# Patient Record
Sex: MALE | Race: WHITE
[De-identification: names, ages, dates, MRNs, and addresses within clinical notes are randomized per-mention and may not be internally consistent; named-entity substitution may affect disease eponyms.]

---

## 2019-01-16 ENCOUNTER — HOSPITAL ENCOUNTER (OUTPATIENT)
Dept: HOSPITAL 96 - M.RAD | Age: 59
End: 2019-01-16
Attending: NURSE PRACTITIONER
Payer: COMMERCIAL

## 2019-01-16 DIAGNOSIS — R50.9: ICD-10-CM

## 2019-01-16 DIAGNOSIS — I51.7: Primary | ICD-10-CM

## 2020-06-16 ENCOUNTER — HOSPITAL ENCOUNTER (INPATIENT)
Dept: HOSPITAL 96 - M.ERS | Age: 60
LOS: 3 days | Discharge: HOME | DRG: 309 | End: 2020-06-19
Attending: FAMILY MEDICINE | Admitting: FAMILY MEDICINE
Payer: COMMERCIAL

## 2020-06-16 VITALS — HEIGHT: 72.01 IN | WEIGHT: 254 LBS | BODY MASS INDEX: 34.4 KG/M2

## 2020-06-16 VITALS — DIASTOLIC BLOOD PRESSURE: 74 MMHG | SYSTOLIC BLOOD PRESSURE: 127 MMHG

## 2020-06-16 DIAGNOSIS — I48.0: Primary | ICD-10-CM

## 2020-06-16 DIAGNOSIS — Z79.899: ICD-10-CM

## 2020-06-16 DIAGNOSIS — Z79.82: ICD-10-CM

## 2020-06-16 DIAGNOSIS — Z88.8: ICD-10-CM

## 2020-06-16 DIAGNOSIS — Z82.49: ICD-10-CM

## 2020-06-16 DIAGNOSIS — D68.69: ICD-10-CM

## 2020-06-16 DIAGNOSIS — I42.9: ICD-10-CM

## 2020-06-16 DIAGNOSIS — Z88.0: ICD-10-CM

## 2020-06-16 DIAGNOSIS — E78.5: ICD-10-CM

## 2020-06-16 DIAGNOSIS — E11.9: ICD-10-CM

## 2020-06-16 DIAGNOSIS — F10.11: ICD-10-CM

## 2020-06-16 DIAGNOSIS — Z90.89: ICD-10-CM

## 2020-06-16 DIAGNOSIS — I10: ICD-10-CM

## 2020-06-16 LAB
ABSOLUTE BASOPHILS: 0.1 THOU/UL (ref 0–0.2)
ABSOLUTE EOSINOPHILS: 0.3 THOU/UL (ref 0–0.7)
ABSOLUTE MONOCYTES: 1.1 THOU/UL (ref 0–1.2)
ALBUMIN SERPL-MCNC: 4.3 G/DL (ref 3.4–5)
ALP SERPL-CCNC: 73 U/L (ref 46–116)
ALT SERPL-CCNC: 41 U/L (ref 30–65)
ANION GAP SERPL CALC-SCNC: 5 MMOL/L (ref 7–16)
AST SERPL-CCNC: 26 U/L (ref 15–37)
BASOPHILS NFR BLD AUTO: 0.8 %
BILIRUB SERPL-MCNC: 0.5 MG/DL
BUN SERPL-MCNC: 20 MG/DL (ref 7–18)
CALCIUM SERPL-MCNC: 9.2 MG/DL (ref 8.5–10.1)
CHLORIDE SERPL-SCNC: 104 MMOL/L (ref 98–107)
CO2 SERPL-SCNC: 34 MMOL/L (ref 21–32)
CREAT SERPL-MCNC: 1.3 MG/DL (ref 0.6–1.3)
EOSINOPHIL NFR BLD: 2.8 %
GLUCOSE SERPL-MCNC: 187 MG/DL (ref 70–99)
GRANULOCYTES NFR BLD MANUAL: 70.1 %
HCT VFR BLD CALC: 44.9 % (ref 42–52)
HGB BLD-MCNC: 15.9 GM/DL (ref 14–18)
INR PPP: 1.1
LIPASE: 159 U/L (ref 73–393)
LYMPHOCYTES # BLD: 1.7 THOU/UL (ref 0.8–5.3)
LYMPHOCYTES NFR BLD AUTO: 16 %
MAGNESIUM SERPL-MCNC: 2.1 MG/DL (ref 1.8–2.4)
MCH RBC QN AUTO: 32.1 PG (ref 26–34)
MCHC RBC AUTO-ENTMCNC: 35.4 G/DL (ref 28–37)
MCV RBC: 90.6 FL (ref 80–100)
MONOCYTES NFR BLD: 10.3 %
MPV: 7.2 FL. (ref 7.2–11.1)
NEUTROPHILS # BLD: 7.7 THOU/UL (ref 1.6–8.1)
NT-PRO BRAIN NAT PEPTIDE: 126 PG/ML (ref ?–300)
NUCLEATED RBCS: 0 /100WBC
PLATELET COUNT*: 274 THOU/UL (ref 150–400)
POTASSIUM SERPL-SCNC: 3.4 MMOL/L (ref 3.5–5.1)
PROT SERPL-MCNC: 8 G/DL (ref 6.4–8.2)
PROTHROMBIN TIME: 10.9 SECONDS (ref 9.2–11.5)
RBC # BLD AUTO: 4.96 MIL/UL (ref 4.5–6)
RDW-CV: 13.7 % (ref 10.5–14.5)
SODIUM SERPL-SCNC: 143 MMOL/L (ref 136–145)
WBC # BLD AUTO: 10.9 THOU/UL (ref 4–11)

## 2020-06-17 VITALS — SYSTOLIC BLOOD PRESSURE: 110 MMHG | DIASTOLIC BLOOD PRESSURE: 65 MMHG

## 2020-06-17 VITALS — SYSTOLIC BLOOD PRESSURE: 143 MMHG | DIASTOLIC BLOOD PRESSURE: 75 MMHG

## 2020-06-17 VITALS — DIASTOLIC BLOOD PRESSURE: 67 MMHG | SYSTOLIC BLOOD PRESSURE: 131 MMHG

## 2020-06-17 VITALS — DIASTOLIC BLOOD PRESSURE: 67 MMHG | SYSTOLIC BLOOD PRESSURE: 106 MMHG

## 2020-06-17 VITALS — DIASTOLIC BLOOD PRESSURE: 74 MMHG | SYSTOLIC BLOOD PRESSURE: 115 MMHG

## 2020-06-17 LAB
BILIRUB UR-MCNC: NEGATIVE MG/DL
COLOR UR: YELLOW
KETONES UR STRIP-MCNC: NEGATIVE MG/DL
PROT UR QL STRIP: NEGATIVE
RBC # UR STRIP: NEGATIVE /UL
SP GR UR STRIP: 1.02 (ref 1–1.03)
URINE CLARITY: CLEAR
URINE GLUCOSE-RANDOM: NEGATIVE
URINE LEUKOCYTES-REFLEX: NEGATIVE
URINE NITRITE-REFLEX: NEGATIVE
UROBILINOGEN UR STRIP-ACNC: 0.2 E.U./DL (ref 0.2–1)

## 2020-06-17 NOTE — 2DMMODE
[place] [address]
Phone:  [phone] 2 D/M-MODE ECHOCARDIOGRAM     
_______________________________________________________________________________
 
Name:         YOLANDA POST             Room:          [address] Jennifer ANDREWS#:    [id number]     Account #:     [id number]  
Admission:    20    Attend Phys:   Abran galvez Sa
Discharge:                Date of Birth: [birth year]  
Date of Service: 20 1301  Report #:      [id number]
        [id number]
_______________________________________________________________________________
THIS REPORT FOR:
 
cc:  Nik Bauer,Nik Smith,Yolanda ARAYA MD [facility]        
                                                                       ~
 
--------------- APPROVED REPORT --------------
 
 
Study performed:  2020 11:35:31
 
EXAM: Comprehensive 2D, Doppler, and color-flow 
Echocardiogram 
Patient Location: In-Patient   
 
      BSA:         2.37
HR: 56 bpm BP:          143/75 mmHg 
 
Other Information 
Study Quality: Fair
 
Indications
Atrial Fibrillation
 
2D Dimensions
IVSd:  12.05 (7-11mm) LVOT Diam:  21.46 (18-24mm) 
LVDd:  45.00 mm  
PWd:  11.67 (7-11mm) Ascending Ao:  36.28 (22-36mm)
LVDs:  34.41 (25-40mm) 
Aortic Root:  32.23 mm 
 
Volumes
Left Atrial Volume (Systole) 
    LA ESV Index:  28.00 mL/m2
 
Aortic Valve
AoV Peak Tapan.:  1.12 m/s 
AO Peak Gr.:  5.06 mmHg  LVOT Max PG:  3.32 mmHg
AO Mean Gr.:  3.03 mmHg  LVOT Mean P.66 mmHg
    LVOT Max V:  0.91 m/s
AO V2 VTI:  25.42 cm  LVOT Mean V:  0.60 m/s
HERIBERTO (VTI):  2.57 cm2  LVOT V1 VTI:  18.04 cm
 
Mitral Valve
    E/A Ratio:  1.18
 
 
[place] [address]
Phone:  [phone]                     2 D/M-MODE ECHOCARDIOGRAM     
_______________________________________________________________________________
 
Name:         YOLANDA POST             Room:          [address]
M.R.#:    [id number]     Account #:     [id number]  
Admission:    20    Attend Phys:   Abran galvez Sa
Discharge:                Date of Birth: [birth year]  
Date of Service: 20 1301  Report #:      [id number]
        [id number]
_______________________________________________________________________________
    MV Decel. Time:  205.12 ms
MV E Max Tapan.:  0.54 m/s 
MV PHT:  59.49 ms  
MVA (PHT):  3.70 cm2  
 
TDI
E/Lateral E':  5.40 E/Medial E':  6.00
   Medial E' Tapan.:  0.09 m/s
   Lateral E' Tapan.:  0.10 m/s
 
Pulmonary Valve
PV Peak Tapan.:  0.84 m/s PV Peak Gr.:  2.81 mmHg
 
Tricuspid Valve
    RAP Estimate:  20.00 mmHg
TR Peak Gr.:  26.60 mmHg RVSP:  46.60 mmHg
    PA Pressure:  46.60 mmHg
 
Left Ventricle
The left ventricle is normal size. There is normal LV segmental wall 
motion. Mild concentric left ventricular hypertrophy. Left 
ventricular systolic function is borderline. LVEF is 45-50%.
 
Right Ventricle
Right ventricle is dilated. The right ventricular systolic function 
is normal.
 
Atria
The left atrium size is normal. Atrial septal aneurysm is present. 
Right atrium is dilated.
 
Aortic Valve
The aortic valve is normal in structure. No aortic regurgitation is 
present. There is no aortic valvular stenosis.
 
Mitral Valve
The mitral valve is normal in structure. There is no mitral valve 
regurgitation noted. No evidence of mitral valve stenosis.
 
Tricuspid Valve
The tricuspid valve is normal in structure. Trace tricuspid 
regurgitation.
 
Pulmonic Valve
Pulmonic valve is not well visualized. There is no pulmonic valvular 
regurgitation.
 
 
[place] [address]
Phone:  [phone]                     2 D/M-MODE ECHOCARDIOGRAM     
_______________________________________________________________________________
 
Name:         YOLANDA POST             Room:          [address]#:    [id number]     Account #:     [id number]  
Admission:    20    Attend Phys:   Abran galvez Sa
Discharge:                Date of Birth: [birth year]  
Date of Service: 20 1301  Report #:      [id number]
        [id number]
_______________________________________________________________________________
 
Great Vessels
The aortic root is normal in size. IVC is dilated.
 
Pericardium
There is no pericardial effusion.
 
<Conclusion>
LVEF is 45-50%.
Mild concentric left ventricular hypertrophy.
 
 
 
 
 
 
 
 
 
 
 
 
 
 
 
 
 
 
 
 
 
 
 
 
 
 
 
 
 
 
 
 
 
 
  <ELECTRONICALLY SIGNED>
                                           By: Yolanda Logan MD, [facility]      
  20     1301
D: 20 130   _____________________________________
T: 20 130   Yolanda Logan MD, FAC        /INF

## 2020-06-17 NOTE — NUR
PT PLACED ON HEPARIN QTT FOR ANTICOAGULATION. PT CONVERTED TO SB. CARDIZEM QTT
TURNED DOWN TO 2.5 MG/HR. REMAINS NPO FOR CARDIOLOGY CONSULT.

## 2020-06-17 NOTE — NUR
PT ALERT ORIENTED. VOIDING PER URINAL AT BS.  OUT OF ROLLING IV POLES. PTS IV
PUMP IS ON TABLE. CARDIZEM QTT AT 10MG/HR. IV DC PER PT AND RESTARTED IN L
HAND. O2 AT 2 LITERS NC. NPO FOR CARDIOLOGY CONSULT. WCTM

## 2020-06-17 NOTE — EKG
[place] [address]
Phone:  [phone]                     ELECTROCARDIOGRAM REPORT      
_______________________________________________________________________________
 
Name:         YOLANDA POST             Room:          [address]
M.R.#:    [id number]     Account #:     [id number]  
Admission:    20    Attend Phys:   Abran galvez Sa
Discharge:                Date of Birth: [birth year]  
Date of Service: 20  Report #:      [id number]
        [id number]
_______________________________________________________________________________
THIS REPORT FOR:  //name//                      
 
                         [facility] ED
                                       
Test Date:    2020               Test Time:    20:48:50
Pat Name:     YOLANDA POST               Department:   
Patient ID:   [id number]            Room:         [facility]
Gender:       M                        Technician:   MATT
:          1960               Requested By: Dia Lorenzana
Order Number: [id number] MD:   Yolanda Logan
                                 Measurements
Intervals                              Axis          
Rate:         118                      P:            
IN:                                    QRS:          8
QRSD:         98                       T:            12
QT:           297                                    
QTc:          417                                    
                           Interpretive Statements
Atrial fibrillation
Borderline repolarization abnormality
No previous ECG available for comparison
 
Electronically Signed On 2020 9:10:02 CDT by Yolanda Logan
[email or web address]
 
 
 
 
 
 
 
 
 
 
 
 
 
 
 
 
 
 
 
 
  <ELECTRONICALLY SIGNED>
                                           By: Yolanda Logan MD, [facility]      
  06/910
D: 20   _____________________________________
T: 20   Yolanda Logan MD, [facility]        /EPI

## 2020-06-17 NOTE — NUR
CARDIOLOGY SAW PT.TODAY IN CONSULT. PUT ON SOTALOL AND ELIQUIS BY CARDIOLOGY.
NO USE OF DME AT HOME AND IS USUALLY INDEPENDENT. CM WILL FOLLOW.

## 2020-06-17 NOTE — NUR
PT IN BED T/O DAY DENIES ANY PAIN IV HEPARIN AND CARDIZEM GTT DC'D AROUND 10AM
PO SOTALOL TO CONTINUE SINUS RACHANA ON THE MONITOR 55-60BPM 2L NC AT 97-98%
DOES NOT WEAR O2 AT HOME PLAN FOR CARDIO TO MONITOR PT THE NEXT COUPLE OF DAYS
TO STAY IN RHYTHM ACHS STARTED D/T PREDIABETIC BEFORE AND BLOOD SUGAR WAS
ELEVATED DURING ADMIT CALL LIGHT IN REACH

## 2020-06-17 NOTE — NUR
Cardiac Rehab education on Atrila Fib completed with receptive patient and
wife using the Atrial Fib handout.  Questions answered to patient and wife's
satisfaction.

## 2020-06-18 VITALS — SYSTOLIC BLOOD PRESSURE: 130 MMHG | DIASTOLIC BLOOD PRESSURE: 79 MMHG

## 2020-06-18 VITALS — SYSTOLIC BLOOD PRESSURE: 121 MMHG | DIASTOLIC BLOOD PRESSURE: 80 MMHG

## 2020-06-18 VITALS — SYSTOLIC BLOOD PRESSURE: 128 MMHG | DIASTOLIC BLOOD PRESSURE: 73 MMHG

## 2020-06-18 VITALS — SYSTOLIC BLOOD PRESSURE: 116 MMHG | DIASTOLIC BLOOD PRESSURE: 64 MMHG

## 2020-06-18 VITALS — SYSTOLIC BLOOD PRESSURE: 130 MMHG | DIASTOLIC BLOOD PRESSURE: 77 MMHG

## 2020-06-18 LAB
ANION GAP SERPL CALC-SCNC: 5 MMOL/L (ref 7–16)
BUN SERPL-MCNC: 17 MG/DL (ref 7–18)
CALCIUM SERPL-MCNC: 8.9 MG/DL (ref 8.5–10.1)
CHLORIDE SERPL-SCNC: 105 MMOL/L (ref 98–107)
CO2 SERPL-SCNC: 31 MMOL/L (ref 21–32)
CREAT SERPL-MCNC: 1.1 MG/DL (ref 0.6–1.3)
EST. AVERAGE GLUCOSE BLD GHB EST-MCNC: 131 MG/DL
GLUCOSE SERPL-MCNC: 114 MG/DL (ref 70–99)
GLYCOHEMOGLOBIN (HGB A1C): 6.2 % (ref 4.8–5.6)
HCT VFR BLD CALC: 42.8 % (ref 42–52)
HGB BLD-MCNC: 15.1 GM/DL (ref 14–18)
MAGNESIUM SERPL-MCNC: 2.1 MG/DL (ref 1.8–2.4)
MCH RBC QN AUTO: 32 PG (ref 26–34)
MCHC RBC AUTO-ENTMCNC: 35.2 G/DL (ref 28–37)
MCV RBC: 91.1 FL (ref 80–100)
MPV: 7.1 FL. (ref 7.2–11.1)
PLATELET COUNT*: 253 THOU/UL (ref 150–400)
POTASSIUM SERPL-SCNC: 3.8 MMOL/L (ref 3.5–5.1)
RBC # BLD AUTO: 4.7 MIL/UL (ref 4.5–6)
RDW-CV: 13.4 % (ref 10.5–14.5)
SODIUM SERPL-SCNC: 141 MMOL/L (ref 136–145)
WBC # BLD AUTO: 9 THOU/UL (ref 4–11)

## 2020-06-18 NOTE — NUR
PT IN BED T/O MORNING HAD BM BLOOD SUGAR WNL HGB A1C WAS 6.2 THOUGH PT DENIES
PAIN STILL SINUS RACHANA ON THE MONITOR 55-60S SOTALOL CONTINUES PT IS
DROWSY/SLEEPY BUT NO OTHER SIDE EFFECTS NOTED ALERT AND ORIENTED UP AD [facility]
CALL LIGHT IN REACH

## 2020-06-18 NOTE — CON
[address]  [address]                    CONSULTATION                  
_______________________________________________________________________________
 
Name:       SINCEREYOLANDA YOLANDA              Room:           [address]    [place]M [place]  
M.R.#:  [id number]      Account #:      [id number]  
Admission:  06/17/20     Attend Phys:    Abran Camacho
Discharge:               Date of Birth:  [birth year]  
         Report #: [id number]
                                                                     [id number]  
_______________________________________________________________________________
THIS REPORT FOR:  //name//                      
 
cc:  Nik Bauer Vincent R. DO                                                
THIS REPORT FOR:  //name//                      
 
CC: Abran Petty
 
DATE OF SERVICE:  06/17/2020
 
 
CARDIOLOGY CONSULTATION
 
HISTORY OF PRESENT ILLNESS:  The patient is a 60-year-old  white male who
I was asked to see in the hospital today after he had an episode of atrial
fibrillation.  The patient has an extensive and complicated past medical
history.  Unfortunately, none of his old records are available here at [facility].  He notes that 10 years ago, he was having episodes of shortness of
breath and diaphoresis.  He was found to have evidence of a cardiomyopathy.  He
apparently underwent a cardiac catheterization at [facility] and
told there was no significant coronary artery disease.  At one time, his
ejection fraction was only 32%.  He denied ever being approached for transplant
or need for ICD.  He has been followed by Cardiology since that time.  He does
note a year ago, he saw his cardiologist at [facility] and his ejection fraction
was up to 69%.  He stays very active.  Denies any recent shortness of breath,
edema, orthopnea, fever, cough, chest tightness.  He does note few months ago,
he had an episode where his heart was beating irregular lasted about a few
minutes, resolved.  He then notes that last night he was at home, he felt his
heart beating irregular, felt a little lightheaded.  He was brought to the
Emergency Room at [facility] by private vehicle.  He was found to be in atrial
fibrillation.  He was started on IV diltiazem.  He converted to sinus rhythm.  I
was asked to see him for further evaluation and treatment.  He denies recent
bleeding episodes.
 
PAST MEDICAL HISTORY:  He has had appendectomy, knee surgery.  He has a history
of hypertension, hyperlipidemia, glucose intolerance.
 
MEDICATIONS:  Include carvedilol, lisinopril, Lipitor, aspirin and uses inhaler
for asthma.
 
ALLERGIES:  HE HAS AN ALLERGY TO PENICILLIN.
 
FAMILY HISTORY:  His father had a heart attack.
 
SOCIAL HISTORY:  He is .  He and his wife live in [place], [place].  He
 
 
 
[place] [address]                    CONSULTATION                  
_______________________________________________________________________________
 
Name:       YOLANDA POST              Room:           [address] [place]  
[facility]#:  [id number]      Account #:      [id number]  
Admission:  06/17/20     Attend Phys:    Abran Camacho
Discharge:               Date of Birth:  [birth year]  
         Report #: [id number]
                                                                     [id number]  
_______________________________________________________________________________
works as a .  He does not exercise on a regular basis.  No tobacco
abuse.  He used to drink up to a fifth of whiskey a day.  He did not go through
AA, but quit drinking years ago when he was diagnosed cardiomyopathy, now has 1
drink of alcohol a day.  Drinks coffee every day.  He has a history of smoking
methamphetamines in the past, no longer abuses drugs, denies IV drug abuse.
 
REVIEW OF SYSTEMS:  No history of stroke.  He does snore at night.  No history
of liver disease, kidney disease, cancer, psychiatric illness, chronic skin
condition.
 
PHYSICAL EXAMINATION:
GENERAL:  Revealed a large male who is 5 feet 10 inches, 260 pounds, he used to
weigh 275 pounds.
HEENT:  He is anicteric.  Conjunctivae are pink.  Mucous membranes moist.
NECK:  Veins nondistended.  No carotid bruits.  Neck supple.
CHEST:  Clear to auscultation.
CARDIOVASCULAR:  Regular rate and rhythm.
ABDOMEN:  Obese.
EXTREMITIES:  Had no edema.
SKIN:  Warm and dry.
NEUROLOGIC:  Nonfocal.
 
DIAGNOSTIC DATA:  His ECG last night when he arrived in the Emergency Room at
8:00, he was in atrial fibrillation with an increased ventricular response rate.
 Currently, he is in a sinus rhythm.  His workup in the Emergency Room last
night, he had a portable chest x-ray that showed normal heart size, clear lung
fields.
 
LABORATORY WORK:  Sodium 143, potassium 3.4, creatinine 1.3, glucose is 187. 
His liver function studies were normal.  Troponin 0.06.  .  TSH 5.7.  His
white blood cell count 10.9, hemoglobin 15.9.
 
IMPRESSION AND RECOMMENDATIONS:
1.  Paroxysmal atrial fibrillation.  I would recommend switching from carvedilol
to sotalol.  I would continue anticoagulation because of his history of
cardiomyopathy.  At this time, I would recommend starting Eliquis 5 mg every 12
hours.
2.  Previous history of cardiomyopathy.  Appears to resolve.  Recommend repeat
echo.  The patient is on a beta blocker and ACE inhibitor.
3.  Hyperlipidemia.  The patient is on a statin drug.
4.  Glucose intolerance.
5.  History of alcohol abuse.
6.  History of illicit drug use.
 
 
 
[address]  [address]                    CONSULTATION                  
_______________________________________________________________________________
 
Name:       YOLANDA POST              Room:           [address] [place]  
M.R.#:  [id number]      Account #:      [id number]  
Admission:  06/17/20     Attend Phys:    Abran crouch [place]
Discharge:               Date of Birth:  [birth year]  
         Report #: [id number]
                                                                     [id number]  
_______________________________________________________________________________
7.  Snoring at night.  I would rule out sleep apnea.
8.  Obesity.
 
 
 
 
 
 
 
 
 
 
 
 
 
 
 
 
 
 
 
 
 
 
 
 
 
 
 
 
 
 
 
 
 
 
 
 
 
 
 
 
 
 
<ELECTRONICALLY SIGNED>
                                        By:  Yolanda Logan MD, FACC      
06/18/20     1658
D: 06/17/20 1105_______________________________________
T: 06/17/20 1436Damaco Logan MD, FACC         /nt

## 2020-06-18 NOTE — EKG
[place] [address]
Phone:  [phone]                     ELECTROCARDIOGRAM REPORT      
_______________________________________________________________________________
 
Name:         YOLANDA POST             Room:          [address]
M.R.#:    [id number]     Account #:     [id number]  
Admission:    20    Attend Phys:   Abran galvez Sa
Discharge:                Date of Birth: [birth year]  
Date of Service: 20  Report #:      [id number]
        [id number]
_______________________________________________________________________________
THIS REPORT FOR:  //name//                      
 
                          [facility]
                                       
Test Date:    2020               Test Time:    08:22:59
Pat Name:     YOLANDA POST               Department:   
Patient ID:   [id number]            Room:         [address]
Gender:       M                        Technician:   
:          1960               Requested By: Yolanda Logan
Order Number: [id number]    [place] MD:   Yolanda Logan
                                 Measurements
Intervals                              Axis          
Rate:         50                       P:            20
IL:           170                      QRS:          8
QRSD:         94                       T:            33
QT:           436                                    
QTc:          398                                    
                           Interpretive Statements
Sinus bradycardia
Compared to ECG 2020 20:48:50
Atrial fibrillation no longer present
 
Electronically Signed On 2020 10:25:15 CDT by Yolanda Logan
[email or web address]
 
 
 
 
 
 
 
 
 
 
 
 
 
 
 
 
 
 
 
 
  <ELECTRONICALLY SIGNED>
                                           By: Yolanda Logan MD, [facility]      
  20     1025
D: 20   _____________________________________
T: 20   Yolanda Logan MD, [facility]        /EPI

## 2020-06-19 VITALS — SYSTOLIC BLOOD PRESSURE: 120 MMHG | DIASTOLIC BLOOD PRESSURE: 75 MMHG

## 2020-06-19 VITALS — DIASTOLIC BLOOD PRESSURE: 81 MMHG | SYSTOLIC BLOOD PRESSURE: 119 MMHG

## 2020-06-19 VITALS — SYSTOLIC BLOOD PRESSURE: 141 MMHG | DIASTOLIC BLOOD PRESSURE: 83 MMHG

## 2020-06-19 VITALS — DIASTOLIC BLOOD PRESSURE: 83 MMHG | SYSTOLIC BLOOD PRESSURE: 141 MMHG

## 2020-06-19 NOTE — EKG
[place] [address]
Phone:  [phone]                     ELECTROCARDIOGRAM REPORT      
_______________________________________________________________________________
 
Name:         YOLANDA POST             Room:          [address]    ADM IN 
M.R.#:    [id number]     Account #:     [id number]  
Admission:    20    Attend Phys:   Abran galvez Sa
Discharge:                Date of Birth: [birth year]  
Date of Service: 20 0842  Report #:      [id number]
        [id number]
_______________________________________________________________________________
THIS REPORT FOR:  //name//                      
 
                          [facility]
                                       
Test Date:    2020               Test Time:    08:42:31
Pat Name:     YOLANDA POST               Department:   
Patient ID:   [id number]            Room:         [address]
Gender:       M                        Technician:   
:          1960               Requested By: Yolanda Logan
Order Number: [id number]    [place] MD:   Yolanda Logan
                                 Measurements
Intervals                              Axis          
Rate:         53                       P:            38
FL:           162                      QRS:          6
QRSD:         99                       T:            36
QT:           428                                    
QTc:          402                                    
                           Interpretive Statements
Sinus bradycardia
Compared to ECG 2020 08:22:59
no change
 
Electronically Signed On 2020 10:25:46 CDT by Yolanda Logan
[email or web address]
 
 
 
 
 
 
 
 
 
 
 
 
 
 
 
 
 
 
 
 
  <ELECTRONICALLY SIGNED>
                                           By: Yolanda Logan MD, [facility]      
  20     1025
D: 20 0842   _____________________________________
T: 20 0842   Yolanda Logan MD, [facility]        /EPI

## 2020-06-19 NOTE — NUR
PT CARE ASSUMED AT 1930. SAT MAINTAINED IN RA. ALERT AND ORIENTED X4. DENIES
PAIN AND SOB. CALL LIGHT WITHIN REACH AND BED IN LOW POSITION. HOURLY ROUNDING
DONE FOR PT SAFETY.

## 2020-06-19 NOTE — NUR
ASSUMED CARE OF PATIENT THIS AM AT 0730.  PATIENT IS ALERT AND ORIENTED X 4.
HE DENIES PAIN AND DISCOMFORT.  PATIENT CONTINUES SR TO SBRADY THIS AM.
DR ROBERTO IN THIS AM TO WRITE DISCHARGE ORDERS.  FOLLOWED BY DR OROSCO.
MEDICATION SAMPLES GIVEN TO PATIENT BY CARDIOLOGY NURSE.  PATIENT GIVEN
DISCHARGE,FOLLOWUP INSTRUCTIONS, PRESCRIPTIONS AND CARENOTES.  SALINE LOCK AND
TELE MONITOR DISCONTINUED.  PATIENT DISCHARGED TO HOME WITH SPOUSE.

## 2021-05-24 ENCOUNTER — HOSPITAL ENCOUNTER (OUTPATIENT)
Dept: HOSPITAL 96 - M.ERS | Age: 61
Setting detail: OBSERVATION
LOS: 2 days | Discharge: HOME | End: 2021-05-26
Attending: STUDENT IN AN ORGANIZED HEALTH CARE EDUCATION/TRAINING PROGRAM | Admitting: STUDENT IN AN ORGANIZED HEALTH CARE EDUCATION/TRAINING PROGRAM
Payer: COMMERCIAL

## 2021-05-24 VITALS — BODY MASS INDEX: 32.2 KG/M2 | HEIGHT: 72.99 IN | WEIGHT: 243 LBS

## 2021-05-24 VITALS — DIASTOLIC BLOOD PRESSURE: 60 MMHG | SYSTOLIC BLOOD PRESSURE: 105 MMHG

## 2021-05-24 VITALS — DIASTOLIC BLOOD PRESSURE: 66 MMHG | SYSTOLIC BLOOD PRESSURE: 132 MMHG

## 2021-05-24 VITALS — SYSTOLIC BLOOD PRESSURE: 176 MMHG | DIASTOLIC BLOOD PRESSURE: 133 MMHG

## 2021-05-24 VITALS — DIASTOLIC BLOOD PRESSURE: 71 MMHG | SYSTOLIC BLOOD PRESSURE: 127 MMHG

## 2021-05-24 DIAGNOSIS — E11.9: ICD-10-CM

## 2021-05-24 DIAGNOSIS — I42.9: ICD-10-CM

## 2021-05-24 DIAGNOSIS — E78.5: ICD-10-CM

## 2021-05-24 DIAGNOSIS — Z79.899: ICD-10-CM

## 2021-05-24 DIAGNOSIS — Z20.822: ICD-10-CM

## 2021-05-24 DIAGNOSIS — I10: ICD-10-CM

## 2021-05-24 DIAGNOSIS — I48.20: Primary | ICD-10-CM

## 2021-05-24 DIAGNOSIS — Z88.0: ICD-10-CM

## 2021-05-24 DIAGNOSIS — Z79.82: ICD-10-CM

## 2021-05-24 LAB
ABSOLUTE BASOPHILS: 0.1 THOU/UL (ref 0–0.2)
ABSOLUTE EOSINOPHILS: 0.3 THOU/UL (ref 0–0.7)
ABSOLUTE MONOCYTES: 1.1 THOU/UL (ref 0–1.2)
ALBUMIN SERPL-MCNC: 4.3 G/DL (ref 3.4–5)
ALP SERPL-CCNC: 88 U/L (ref 46–116)
ALT SERPL-CCNC: 39 U/L (ref 30–65)
ANION GAP SERPL CALC-SCNC: 7 MMOL/L (ref 7–16)
AST SERPL-CCNC: 19 U/L (ref 15–37)
BASOPHILS NFR BLD AUTO: 0.8 %
BILIRUB SERPL-MCNC: 0.5 MG/DL
BUN SERPL-MCNC: 16 MG/DL (ref 7–18)
CALCIUM SERPL-MCNC: 8.9 MG/DL (ref 8.5–10.1)
CHLORIDE SERPL-SCNC: 107 MMOL/L (ref 98–107)
CO2 SERPL-SCNC: 29 MMOL/L (ref 21–32)
CREAT SERPL-MCNC: 1 MG/DL (ref 0.6–1.3)
EOSINOPHIL NFR BLD: 2.5 %
GLUCOSE SERPL-MCNC: 120 MG/DL (ref 70–99)
GRANULOCYTES NFR BLD MANUAL: 71.5 %
HCT VFR BLD CALC: 44.1 % (ref 42–52)
HGB BLD-MCNC: 15.3 GM/DL (ref 14–18)
LIPASE: 211 U/L (ref 73–393)
LYMPHOCYTES # BLD: 1.7 THOU/UL (ref 0.8–5.3)
LYMPHOCYTES NFR BLD AUTO: 15.4 %
MAGNESIUM SERPL-MCNC: 2.1 MG/DL (ref 1.8–2.4)
MCH RBC QN AUTO: 31.2 PG (ref 26–34)
MCHC RBC AUTO-ENTMCNC: 34.7 G/DL (ref 28–37)
MCV RBC: 90.2 FL (ref 80–100)
MONOCYTES NFR BLD: 9.8 %
MPV: 6.7 FL. (ref 7.2–11.1)
NEUTROPHILS # BLD: 8.1 THOU/UL (ref 1.6–8.1)
NT-PRO BRAIN NAT PEPTIDE: 181 PG/ML (ref ?–300)
NUCLEATED RBCS: 0 /100WBC
PLATELET COUNT*: 287 THOU/UL (ref 150–400)
POTASSIUM SERPL-SCNC: 3.6 MMOL/L (ref 3.5–5.1)
PROT SERPL-MCNC: 8.1 G/DL (ref 6.4–8.2)
RBC # BLD AUTO: 4.89 MIL/UL (ref 4.5–6)
RDW-CV: 13.4 % (ref 10.5–14.5)
SODIUM SERPL-SCNC: 143 MMOL/L (ref 136–145)
WBC # BLD AUTO: 11.3 THOU/UL (ref 4–11)

## 2021-05-24 NOTE — EKG
[place] [address]
Phone:  [phone]                     ELECTROCARDIOGRAM REPORT      
_______________________________________________________________________________
 
Name:         SINCEREYOLANDA             Room:                     [facility] 
M.R.#:    [id number]     Account #:     [id number]  
Admission:    21    Attend Phys:                     
Discharge:                Date of Birth: [birth year]  
Date of Service: 21 1430  Report #:      [id number]
        [id number]
_______________________________________________________________________________
THIS REPORT FOR:  //name//                      
 
                         [facility] ED
                                       
Test Date:    2021               Test Time:    14:30:30
Pat Name:     YOLANDA POST               Department:   
Patient ID:   [id number]            Room:          
Gender:                               Technician:   
:          1960               Requested By: Aneesh Villanueva
Order Number: [id number] MD:     
                                 Measurements
Intervals                              Axis          
Rate:         125                      P:            
DE:                                    QRS:          16
QRSD:         95                       T:            32
QT:           306                                    
QTc:          442                                    
                           Interpretive Statements
Atrial fibrillation
Minimal ST depression, lateral leads
Baseline wander in lead(s) II,III,aVF
Compared to ECG 2020 08:42:31
ST (T wave) deviation now present
Sinus bradycardia no longer present
[email or web address]
 
 
 
 
 
 
 
 
 
 
 
 
 
 
 
 
 
 
 
                         
                                           By:                               
                   
D: 21 1430   _____________________________________
T: 21 1430   Epiphany Epiphany, MD           /EPI

## 2021-05-25 VITALS — DIASTOLIC BLOOD PRESSURE: 69 MMHG | SYSTOLIC BLOOD PRESSURE: 120 MMHG

## 2021-05-25 VITALS — DIASTOLIC BLOOD PRESSURE: 81 MMHG | SYSTOLIC BLOOD PRESSURE: 133 MMHG

## 2021-05-25 VITALS — DIASTOLIC BLOOD PRESSURE: 67 MMHG | SYSTOLIC BLOOD PRESSURE: 116 MMHG

## 2021-05-25 VITALS — SYSTOLIC BLOOD PRESSURE: 140 MMHG | DIASTOLIC BLOOD PRESSURE: 85 MMHG

## 2021-05-25 VITALS — DIASTOLIC BLOOD PRESSURE: 72 MMHG | SYSTOLIC BLOOD PRESSURE: 121 MMHG

## 2021-05-25 LAB
ANION GAP SERPL CALC-SCNC: 7 MMOL/L (ref 7–16)
BUN SERPL-MCNC: 13 MG/DL (ref 7–18)
CALCIUM SERPL-MCNC: 8.7 MG/DL (ref 8.5–10.1)
CHLORIDE SERPL-SCNC: 106 MMOL/L (ref 98–107)
CO2 SERPL-SCNC: 31 MMOL/L (ref 21–32)
CREAT SERPL-MCNC: 1.1 MG/DL (ref 0.6–1.3)
GLUCOSE SERPL-MCNC: 108 MG/DL (ref 70–99)
HCT VFR BLD CALC: 44 % (ref 42–52)
HGB BLD-MCNC: 15.3 GM/DL (ref 14–18)
MCH RBC QN AUTO: 31.1 PG (ref 26–34)
MCHC RBC AUTO-ENTMCNC: 34.8 G/DL (ref 28–37)
MCV RBC: 89.4 FL (ref 80–100)
MPV: 7.1 FL. (ref 7.2–11.1)
PLATELET COUNT*: 276 THOU/UL (ref 150–400)
POTASSIUM SERPL-SCNC: 3.4 MMOL/L (ref 3.5–5.1)
RBC # BLD AUTO: 4.92 MIL/UL (ref 4.5–6)
RDW-CV: 13.2 % (ref 10.5–14.5)
SODIUM SERPL-SCNC: 144 MMOL/L (ref 136–145)
WBC # BLD AUTO: 9.5 THOU/UL (ref 4–11)

## 2021-05-25 NOTE — EKG
[place] [address]
Phone:  [phone]                     ELECTROCARDIOGRAM REPORT      
_______________________________________________________________________________
 
Name:         YOLANDA POST             Room:          [address]
M.R.#:    [id number]     Account #:     [id number]  
Admission:    21    Attend Phys:   Junior Be
Discharge:                Date of Birth: [birth year]  
Date of Service: 21  Report #:      [id number]
        [id number]
_______________________________________________________________________________
THIS REPORT FOR:  //name//                      
 
                          [facility]
                                       
Test Date:    2021               Test Time:    08:21:47
Pat Name:     YOLANDA POST               Department:   
Patient ID:   [id number]            Room:         [address]
Gender:       M                        Technician:   
:          1960               Requested By: Junior Be
Order Number: [id number]    Reading MD:   Simón Buchanan
                                 Measurements
Intervals                              Axis          
Rate:         56                       P:            21
AL:           159                      QRS:          14
QRSD:         95                       T:            31
QT:           407                                    
QTc:          393                                    
                           Interpretive Statements
Sinus rhythm
Borderline T wave abnormalities
Compared to ECG 2021 14:30:30
T-wave abnormality now present
Atrial fibrillation no longer present
ST (T wave) deviation no longer present
Electronically Signed On 2021 12:38:43 CDT by Simón Buchanan
[email or web address]
 
 
 
 
 
 
 
 
 
 
 
 
 
 
 
 
 
 
  <ELECTRONICALLY SIGNED>
                                           By: Simón Buchanan MD, [facility]     
  21     1238
D: 21   _____________________________________
T: 21   Simón Buchanan MD, [facility]       /EPI

## 2021-05-25 NOTE — NUR
Pt is A&O. Resides at home with wife. Independent. NO DME. No hx of HH or SNF.
Goal is home at dc, cardiology. NO needs anticipated at dc.

## 2021-05-26 VITALS — SYSTOLIC BLOOD PRESSURE: 142 MMHG | DIASTOLIC BLOOD PRESSURE: 83 MMHG

## 2021-05-26 VITALS — SYSTOLIC BLOOD PRESSURE: 137 MMHG | DIASTOLIC BLOOD PRESSURE: 74 MMHG

## 2021-05-26 VITALS — DIASTOLIC BLOOD PRESSURE: 83 MMHG | SYSTOLIC BLOOD PRESSURE: 142 MMHG

## 2021-05-26 VITALS — DIASTOLIC BLOOD PRESSURE: 75 MMHG | SYSTOLIC BLOOD PRESSURE: 132 MMHG

## 2021-05-26 LAB
EST. AVERAGE GLUCOSE BLD GHB EST-MCNC: 134 MG/DL
GLYCOHEMOGLOBIN (HGB A1C): 6.3 % (ref 4.8–5.6)

## 2021-05-26 NOTE — NUR
ASSUMED PT CARE AT 0730. PT IS A&OX4, COOPERATIVE AND FRIENDLY.PT DENIES ANY
RESPIRATORY DISTRESS. PT IS UP AD [facility] WITHOUT DIFFICULTY. NEW ORDERS TO
DISCHARGE PT TO HOME. DISCHARGE INSTRUCTIONS REVIEWED WITH PT WHO VERBALIZES
UNDERSTANDING. PT TO FOLLOW UP WITH CARDIOLOGY IN 1 WEEK AND REPORTS HE HAS
MADE THAT APPOINTMENT WITH HIS CARDIOLOGIST AT [facility]. HEART MONITOR AND IV
DC'D. PT TAKEN VIA WC BY NURSING STAFF TO EXIT WITH WIFE VIA CAR AT APPROX
1230.

## 2021-10-08 ENCOUNTER — HOSPITAL ENCOUNTER (OUTPATIENT)
Dept: HOSPITAL 96 - M.ULTRA | Age: 61
End: 2021-10-08
Attending: FAMILY MEDICINE
Payer: COMMERCIAL

## 2021-10-08 DIAGNOSIS — I70.0: Primary | ICD-10-CM

## 2021-12-10 ENCOUNTER — HOSPITAL ENCOUNTER (OUTPATIENT)
Dept: HOSPITAL 96 - M.LAB | Age: 61
End: 2021-12-10
Attending: INTERNAL MEDICINE
Payer: COMMERCIAL

## 2021-12-10 DIAGNOSIS — Z20.822: ICD-10-CM

## 2021-12-10 DIAGNOSIS — Z01.812: Primary | ICD-10-CM

## 2021-12-13 ENCOUNTER — HOSPITAL ENCOUNTER (OUTPATIENT)
Dept: HOSPITAL 96 - M.LAB | Age: 61
End: 2021-12-13
Payer: COMMERCIAL

## 2021-12-13 DIAGNOSIS — E87.6: Primary | ICD-10-CM

## 2022-01-14 ENCOUNTER — HOSPITAL ENCOUNTER (OUTPATIENT)
Dept: HOSPITAL 96 - M.LAB | Age: 62
End: 2022-01-14
Attending: FAMILY MEDICINE
Payer: COMMERCIAL

## 2022-01-14 DIAGNOSIS — M47.816: ICD-10-CM

## 2022-01-14 DIAGNOSIS — K40.90: ICD-10-CM

## 2022-01-14 DIAGNOSIS — K57.90: ICD-10-CM

## 2022-01-14 DIAGNOSIS — I70.8: ICD-10-CM

## 2022-01-14 DIAGNOSIS — K42.9: Primary | ICD-10-CM

## 2022-01-14 DIAGNOSIS — N28.1: ICD-10-CM

## 2022-01-14 DIAGNOSIS — E27.8: ICD-10-CM

## 2022-01-14 LAB
BUN SERPL-MCNC: 13 MG/DL (ref 7–18)
CREAT SERPL-MCNC: 1.1 MG/DL (ref 0.6–1.3)

## 2022-01-31 ENCOUNTER — HOSPITAL ENCOUNTER (OUTPATIENT)
Dept: HOSPITAL 96 - M.LAB | Age: 62
Discharge: HOME | End: 2022-01-31
Attending: FAMILY MEDICINE
Payer: COMMERCIAL

## 2022-01-31 VITALS — SYSTOLIC BLOOD PRESSURE: 147 MMHG | DIASTOLIC BLOOD PRESSURE: 75 MMHG

## 2022-01-31 VITALS — DIASTOLIC BLOOD PRESSURE: 70 MMHG | SYSTOLIC BLOOD PRESSURE: 138 MMHG

## 2022-01-31 VITALS — SYSTOLIC BLOOD PRESSURE: 133 MMHG | DIASTOLIC BLOOD PRESSURE: 76 MMHG

## 2022-01-31 VITALS — SYSTOLIC BLOOD PRESSURE: 129 MMHG | DIASTOLIC BLOOD PRESSURE: 70 MMHG

## 2022-01-31 VITALS — BODY MASS INDEX: 33.86 KG/M2 | HEIGHT: 72 IN | WEIGHT: 250 LBS

## 2022-01-31 VITALS — SYSTOLIC BLOOD PRESSURE: 127 MMHG | DIASTOLIC BLOOD PRESSURE: 67 MMHG

## 2022-01-31 VITALS — SYSTOLIC BLOOD PRESSURE: 138 MMHG | DIASTOLIC BLOOD PRESSURE: 73 MMHG

## 2022-01-31 VITALS — SYSTOLIC BLOOD PRESSURE: 138 MMHG | DIASTOLIC BLOOD PRESSURE: 74 MMHG

## 2022-01-31 VITALS — DIASTOLIC BLOOD PRESSURE: 76 MMHG | SYSTOLIC BLOOD PRESSURE: 134 MMHG

## 2022-01-31 VITALS — SYSTOLIC BLOOD PRESSURE: 131 MMHG | DIASTOLIC BLOOD PRESSURE: 71 MMHG

## 2022-01-31 VITALS — DIASTOLIC BLOOD PRESSURE: 81 MMHG | SYSTOLIC BLOOD PRESSURE: 117 MMHG

## 2022-01-31 VITALS — DIASTOLIC BLOOD PRESSURE: 79 MMHG | SYSTOLIC BLOOD PRESSURE: 135 MMHG

## 2022-01-31 VITALS — DIASTOLIC BLOOD PRESSURE: 72 MMHG | SYSTOLIC BLOOD PRESSURE: 138 MMHG

## 2022-01-31 DIAGNOSIS — Z79.899: ICD-10-CM

## 2022-01-31 DIAGNOSIS — Z79.01: ICD-10-CM

## 2022-01-31 DIAGNOSIS — Z20.822: ICD-10-CM

## 2022-01-31 DIAGNOSIS — Z88.0: ICD-10-CM

## 2022-01-31 DIAGNOSIS — I48.91: ICD-10-CM

## 2022-01-31 DIAGNOSIS — I42.9: ICD-10-CM

## 2022-01-31 DIAGNOSIS — E11.9: ICD-10-CM

## 2022-01-31 DIAGNOSIS — Z98.890: ICD-10-CM

## 2022-01-31 DIAGNOSIS — D35.00: Primary | ICD-10-CM

## 2022-01-31 LAB
ALBUMIN SERPL-MCNC: 3.9 G/DL (ref 3.4–5)
ALP SERPL-CCNC: 92 U/L (ref 46–116)
ALT SERPL-CCNC: 41 U/L (ref 30–65)
ANION GAP SERPL CALC-SCNC: 10 MMOL/L (ref 7–16)
APTT BLD: 25.8 SECONDS (ref 25–31.3)
AST SERPL-CCNC: 16 U/L (ref 15–37)
BILIRUB SERPL-MCNC: 0.4 MG/DL
BUN SERPL-MCNC: 14 MG/DL (ref 7–18)
CALCIUM SERPL-MCNC: 8.8 MG/DL (ref 8.5–10.1)
CHLORIDE SERPL-SCNC: 106 MMOL/L (ref 98–107)
CO2 SERPL-SCNC: 29 MMOL/L (ref 21–32)
CREAT SERPL-MCNC: 1.1 MG/DL (ref 0.6–1.3)
GLUCOSE SERPL-MCNC: 137 MG/DL (ref 70–99)
HCT VFR BLD CALC: 40.2 % (ref 42–52)
HGB BLD-MCNC: 13.9 GM/DL (ref 14–18)
INR PPP: 1
MCH RBC QN AUTO: 30.9 PG (ref 26–34)
MCHC RBC AUTO-ENTMCNC: 34.6 G/DL (ref 28–37)
MCV RBC: 89.2 FL (ref 80–100)
MPV: 7.3 FL. (ref 7.2–11.1)
PLATELET COUNT*: 281 THOU/UL (ref 150–400)
POTASSIUM SERPL-SCNC: 3.2 MMOL/L (ref 3.5–5.1)
PROT SERPL-MCNC: 7.4 G/DL (ref 6.4–8.2)
PROTHROMBIN TIME: 10.6 SECONDS (ref 9.2–11.5)
RBC # BLD AUTO: 4.51 MIL/UL (ref 4.5–6)
RDW-CV: 13.5 % (ref 10.5–14.5)
SODIUM SERPL-SCNC: 145 MMOL/L (ref 136–145)
WBC # BLD AUTO: 9.4 THOU/UL (ref 4–11)

## 2022-02-01 LAB
EST. AVERAGE GLUCOSE BLD GHB EST-MCNC: 146 MG/DL
GLYCOHEMOGLOBIN (HGB A1C): 6.7 % (ref 4.8–5.6)

## 2022-02-02 NOTE — PATH
[address]  [address]                    PATHOLOGY RPT PROCEDURE       
_______________________________________________________________________________
 
Name:       YOLANDA QUINTEROS              Room:                      REG GASPER ANDREWS#:  [id number]      Account #:      [id number]  
Admission:  01/31/22     Date of Birth:  [birth year]  
Discharge:                             Report #:    [id number]
                                                         Path Case #: [id number]
_______________________________________________________________________________
 
[facility] Accession Number: [id number]
.                                                                01
Material submitted:                                        .
adrenal gland - L ADRENAL GLAND. Modifiers: left
.                                                                02
**********************************************************************
Diagnosis:
Left adrenal gland, biopsy:
- Adrenal cortical neoplasm.  See comment.
.
(ELISABETH:mml; 02/02/2022)
[facility]  02/02/2022  [address]7 Local
**********************************************************************
.                                                                02
Comment:
All of the tissue cores show similar findings of adrenal cortical tissue
without necrosis, atypia or significant mitotic activity seen.  Several of
the cores have scant fibrous tissue at one end suggesting a capsule.
Properly-controlled reticulin stain shows consistent circumscription of
aggregates of cells.  Properly-controlled immunohistochemical studies
performed on A2 show the following results, supporting an adrenal cortical
neoplasm:
.
PAX-8:  Negative
Inhibin:  Weak positive
CD10:  Negative
Calretinin:  Positive
.
Features to indicate malignancy are not seen; however, to definitively
exclude this possibility, the entire neoplasm needs should be examined.
.
Reviewed with Dr. Christine Page on 2/2/2022 who agrees with the
diagnosis.
.
(ELISABETH:mml; 02/02/2022)
.                                                                02
Electronically signed:                                     .
Ralph Edmonds MD, Pathologist
NPI- [id number]
.                                                                01
Gross description:                                         .
The specimen is received in formalin, labeled "Yolanda Quinteros, adrenal bx"
and additionally labeled on the requisition as, "left adrenal gland".
Received are 3 needle cores of gold-yellow tissue ranging in length from
0.9 to 1.3 cm by 0.1 cm in diameter. The specimen is submitted entirely in
A1-A3.
([facility]; 1/31/2022)
NRI/NRI  02/02/2022  [address] [place] [address]                    PATHOLOGY RPT PROCEDURE       
_______________________________________________________________________________
 
Name:       YOLANDA QUINTEROS              Room:                      REG CLI 
JULIANA#:  [id number]      Account #:      [id number]  
Admission:  01/31/22     Date of Birth:  [birth year]  
Discharge:                             Report #:    [id number]
                                                         Path Case #: [id number]
_______________________________________________________________________________
.                                                                02
Pathologist provided ICD-10:
D35.00
.                                                                02
CPT                                                        .
516706, V66413, U86388, 341369
Specimen Comment: A courtesy copy of this report has been sent to [phone]
[phone]
Specimen Comment: [phone]
Specimen Comment: Report sent to  / DR CASTILLO
***Performed at:  01
   [address], [place], [place]  [id number]
   MD Sp Pennington MD Phone:  [phone]
***Performed at:  02
   [facility]
   [address], [place], [place]  [id number]
   MD Ralph Edmonds MD Phone:  [phone]